# Patient Record
Sex: FEMALE | Race: WHITE | NOT HISPANIC OR LATINO | Employment: UNEMPLOYED | URBAN - METROPOLITAN AREA
[De-identification: names, ages, dates, MRNs, and addresses within clinical notes are randomized per-mention and may not be internally consistent; named-entity substitution may affect disease eponyms.]

---

## 2017-05-20 PROBLEM — M81.0 AGE-RELATED OSTEOPOROSIS WITHOUT CURRENT PATHOLOGICAL FRACTURE: Status: ACTIVE | Noted: 2017-05-20

## 2017-11-29 ENCOUNTER — HOSPITAL ENCOUNTER (OUTPATIENT)
Dept: RADIOLOGY | Facility: HOSPITAL | Age: 77
Discharge: HOME | End: 2017-11-29
Attending: FAMILY MEDICINE
Payer: MEDICARE

## 2017-11-29 VITALS — HEIGHT: 63 IN | WEIGHT: 207 LBS | BODY MASS INDEX: 36.68 KG/M2

## 2017-11-29 DIAGNOSIS — Z12.31 ENCOUNTER FOR SCREENING MAMMOGRAM FOR BREAST CANCER: ICD-10-CM

## 2017-11-29 PROCEDURE — 77067 SCR MAMMO BI INCL CAD: CPT | Mod: 26,,, | Performed by: RADIOLOGY

## 2017-11-29 PROCEDURE — 77067 SCR MAMMO BI INCL CAD: CPT | Mod: TC

## 2017-11-29 PROCEDURE — 77063 BREAST TOMOSYNTHESIS BI: CPT | Mod: 26,,, | Performed by: RADIOLOGY

## 2018-01-05 ENCOUNTER — HOSPITAL ENCOUNTER (EMERGENCY)
Facility: HOSPITAL | Age: 78
Discharge: HOME | End: 2018-01-05
Attending: SURGERY
Payer: MEDICARE

## 2018-01-05 VITALS
DIASTOLIC BLOOD PRESSURE: 66 MMHG | HEIGHT: 63 IN | TEMPERATURE: 100 F | RESPIRATION RATE: 17 BRPM | HEART RATE: 86 BPM | WEIGHT: 210 LBS | BODY MASS INDEX: 37.21 KG/M2 | SYSTOLIC BLOOD PRESSURE: 113 MMHG

## 2018-01-05 DIAGNOSIS — S09.93XA FACIAL TRAUMA: ICD-10-CM

## 2018-01-05 DIAGNOSIS — S42.91XA CLOSED FRACTURE OF RIGHT SHOULDER, INITIAL ENCOUNTER: ICD-10-CM
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
## 2018-01-05 DIAGNOSIS — S02.2XXA CLOSED FRACTURE OF NASAL BONE, INITIAL ENCOUNTER: Primary | ICD-10-CM

## 2018-01-05 PROCEDURE — 12013 RPR F/E/E/N/L/M 2.6-5.0 CM: CPT

## 2018-01-05 PROCEDURE — 96372 THER/PROPH/DIAG INJ SC/IM: CPT

## 2018-01-05 PROCEDURE — 99284 EMERGENCY DEPT VISIT MOD MDM: CPT | Mod: 25

## 2018-01-05 PROCEDURE — 63600175 PHARM REV CODE 636 W HCPCS: Performed by: SURGERY

## 2018-01-05 PROCEDURE — 29240 STRAPPING OF SHOULDER: CPT | Mod: RT,59

## 2018-01-05 PROCEDURE — 25000003 PHARM REV CODE 250: Performed by: SURGERY

## 2018-01-05 RX ORDER — IBUPROFEN 800 MG/1
800 TABLET ORAL
Status: COMPLETED | OUTPATIENT
Start: 2018-01-05 | End: 2018-01-05

## 2018-01-05 RX ORDER — HYDROCODONE BITARTRATE AND ACETAMINOPHEN 10; 325 MG/1; MG/1
1 TABLET ORAL EVERY 6 HOURS PRN
Qty: 20 TABLET | Refills: 0 | Status: SHIPPED | OUTPATIENT
Start: 2018-01-05 | End: 2018-01-15

## 2018-01-05 RX ORDER — ACETAMINOPHEN 500 MG
1000 TABLET ORAL
Status: COMPLETED | OUTPATIENT
Start: 2018-01-05 | End: 2018-01-05

## 2018-01-05 RX ORDER — HYDROMORPHONE HYDROCHLORIDE 2 MG/ML
0.5 INJECTION, SOLUTION INTRAMUSCULAR; INTRAVENOUS; SUBCUTANEOUS
Status: COMPLETED | OUTPATIENT
Start: 2018-01-05 | End: 2018-01-05

## 2018-01-05 RX ORDER — ONDANSETRON 2 MG/ML
4 INJECTION INTRAMUSCULAR; INTRAVENOUS
Status: COMPLETED | OUTPATIENT
Start: 2018-01-05 | End: 2018-01-05

## 2018-01-05 RX ORDER — ONDANSETRON 4 MG/1
4 TABLET, ORALLY DISINTEGRATING ORAL EVERY 8 HOURS PRN
Qty: 20 TABLET | Refills: 0 | Status: SHIPPED | OUTPATIENT
Start: 2018-01-05 | End: 2023-02-06

## 2018-01-05 RX ADMIN — IBUPROFEN 800 MG: 800 TABLET ORAL at 04:01

## 2018-01-05 RX ADMIN — ONDANSETRON 4 MG: 2 INJECTION INTRAMUSCULAR; INTRAVENOUS at 06:01

## 2018-01-05 RX ADMIN — ACETAMINOPHEN 1000 MG: 500 TABLET ORAL at 04:01

## 2018-01-05 RX ADMIN — HYDROMORPHONE HYDROCHLORIDE 0.5 MG: 2 INJECTION INTRAMUSCULAR; INTRAVENOUS; SUBCUTANEOUS at 06:01

## 2018-01-05 NOTE — ED TRIAGE NOTES
"Patient arrived in wheelchair. Patient 77 y.o/f fell on face. Patient stated "I tripped over my grocery." Patient provided medical history. Patient placed in grown. Bright red blood to lip. Oral swelling. Patient able to ambulate without assistant.   "

## 2018-01-06 NOTE — ED NOTES
Discharge instruction/escript instruction given.   Patient verbalized understanding.  Discharge home in stable condition.  No acute distress.

## 2018-01-06 NOTE — ED PROVIDER NOTES
[facility]                                         January 5, 2018                   Chief Complaint  77 y.o. female with Fall and Facial Swelling (Lips)    History of Present Illness  Rosaura Ga presents to the emergency room with right facial and shoulder pain today  Patient had a slip and fall hitting her right face and shoulder, no LOC reported on the fall  The patient is alert and oriented ×3 with a GCS 15 and normal motor neuro exam the ER  Patient has a small horizontal laceration/3 cm above the right lip, Dermabond in the ER  Patient also has significant right shoulder pain with a fracture noted on the x-ray in the ER  Pt is neurovascularly intact with good  and normal tone, good pulses in all extremities    The history is provided by the patient    Past Medical History   -- Arthritis    -- Asthma    -- Asthmatic bronchitis    -- COPD (chronic obstructive pulmonary disease)    -- Encounter for blood transfusion    -- Heart murmur    -- Hypertension    -- Osteoporosis      Past Surgical History   -- APPENDECTOMY     -- APPENDECTOMY     -- CATARACT EXTRACTION     -- COLONOSCOPY W/ BIOPSIES AND POLYPECTOMY     -- EYE SURGERY        ALLERGIES: Amoxicillin and codeine  History reviewed. No pertinent family history.    Review of Systems and Physical Exam     Review of Systems  -- Constitution - no fever, denies fatigue, no weakness, no chills  -- Eyes - no tearing or redness, no visual disturbance  -- Ear, Nose - no tinnitus or earache, no nasal congestion or discharge  -- Mouth,Throat - no sore throat, no toothache, normal voice, normal swallowing  -- Respiratory - denies cough and congestion, no shortness of breath, no ROA  -- Cardiovascular - denies chest pain, no palpitations, denies claudication  -- Gastrointestinal - denies abdominal pain, nausea, vomiting, or diarrhea  -- Genitourinary - no dysuria, no denies flank pain, no hematuria or frequency   -- Musculoskeletal - right  shoulder and facial pain post fall  -- Neurological - no headache, denies weakness or seizure; no LOC  -- Skin - denies pallor, rash, or changes in skin. no hives or welts noted    Vital Signs  -- Her oral temperature is 99.5 °F (37.5 °C).   -- Her blood pressure is 113/66 and her pulse is 86.   -- Her respiration is 17.      Physical Exam  -- Nursing note and vitals reviewed  -- Constitutional: Appears well-developed and well-nourished  -- Head: Minor abrasions and scrapes on the face and right upper lip  -- Eyes: Pupils are equal and reactive to light. Normal conjunctiva and lids  -- Nose: Nose normal in appearance, nares grossly normal. No discharge  -- Throat: Mucous membranes moist, pharynx normal, normal tonsils. No lesions   -- Ears: External ears and TM normal bilaterally. Normal hearing and no drainage  -- Neck: Normal range of motion. Neck supple. No masses, trachea midline  -- Cardiac: Normal rate, regular rhythm and normal heart sounds  -- Pulmonary: Normal respiratory effort, breath sounds clear to auscultation  -- Abdominal: Soft, no tenderness. Normal bowel sounds. Normal liver edge  -- Musculoskeletal: Right shoulder pain and bruising, minor swelling   -- Neurological: No focal deficits. Showed good interaction with staff  -- Vascular: Posterior tibial, dorsalis pedis and radial pulses 2+ bilaterally    -- Lymphatics: No cervical or peripheral lymphadenopathy. No edema noted  -- Skin: Warm and dry. No evidence of rash or cellulitis  -- Psychiatric: Normal mood and affect. Bedside behavior is appropriate    Emergency Room Course     Treatment and Evaluation  -- The CT of the head performed in the ER today was negative for acute pathology   -- CT of the face shows a nondisplaced nasal fracture  -- Right shoulder and humerus x-rays show a humeral head fracture  -- Sling and swathe placed on the affected limb by the CNA for comfort and decreased pain      Laceration Repair  -- Performed by: GRACIE WEBER  ERNESTINE  -- Consent Done: Emergent Situation  -- Body area: Right upper lip/facial abrasion  -- Laceration length: 3 cm  -- Tendon involvement: none  -- Nerve involvement: none  -- Vascular damage: no  -- Amount of cleaning: standard  -- Skin closure: Dermabond     Medications Given  -- acetaminophen tablet 1,000 mg (1,000 mg Oral Given 1/5/18 1617)   -- ibuprofen tablet 800 mg (800 mg Oral Given 1/5/18 1617)   -- hydromorphone (PF) injection 0.5 mg (0.5 mg Intramuscular Given 1/5/18 1812)   -- ondansetron injection 4 mg (4 mg Intramuscular Given 1/5/18 1812)     Medical Decision Making  -- Diagnosis management comments: 77 y.o. female with a trip and fall today  -- Patient has an obvious significant right humeral head fracture, no dislocation  -- I discussed this finding with orthopedics nurse practitioner Jarred this evening  -- She advocates for sling and swath and follow-up in clinic on Monday morning  -- Family is comfortable with this plan, the patient has plenty of support at home    Diagnosis  -- Closed fracture of nasal bone  -- Facial trauma  -- Fall  -- Closed fracture of right shoulder    Disposition and Plan  -- Disposition: home  -- Condition: stable  -- Follow-up: Patient to follow up with or so Monday morning at 9:30 AM  -- I advised the patient that we have found no life threatening condition today  -- At this time, I believe the patient is clinically stable for discharge.   -- The patient acknowledges that close follow up with a MD is required   -- Patient agrees to comply with all instruction and direction given in the ER    This note is dictated on Dragon Natural Speaking word recognition program.  There are word recognition mistakes that are occasionally missed on review.           Donato Robledo MD  01/05/18 192

## 2022-01-24 ENCOUNTER — LAB VISIT (OUTPATIENT)
Dept: FAMILY MEDICINE | Facility: CLINIC | Age: 82
End: 2022-01-24
Payer: MEDICARE

## 2022-01-24 DIAGNOSIS — Z11.52 ENCOUNTER FOR SCREENING FOR COVID-19: Primary | ICD-10-CM

## 2022-01-24 LAB
CTP QC/QA: YES
SARS-COV-2 AG RESP QL IA.RAPID: NEGATIVE

## 2022-01-24 PROCEDURE — 87811 SARS-COV-2 COVID19 W/OPTIC: CPT | Mod: PBBFAC

## 2022-01-24 NOTE — PROGRESS NOTES
Instructions for Patients with Confirmed or Suspected COVID-19    If you are awaiting your test result, you will either be called or it will be released to the patient portal.  If you have any questions about your test, please visit [email or web address] or call our COVID-19 information line at [phone].      Please isolate yourself at home.  You may leave home and/or return to work once the following conditions are met:    If you have symptoms and tested positive:   More than 5 days since symptoms first appeared AND   More than 24 hours fever free without medications AND       symptoms have improved   · For five days after ending isolation, masks are required.    If you had no symptoms but tested positive:   More than 5 days since the date of the first positive test. If you develop symptoms, then use the guidelines above  · For five days after ending isolation, masks are required.      Testing is not recommended if you are symptom free after completing isolation.

## 2023-01-26 ENCOUNTER — HOSPITAL ENCOUNTER (EMERGENCY)
Facility: HOSPITAL | Age: 83
Discharge: HOME | End: 2023-01-27
Attending: STUDENT IN AN ORGANIZED HEALTH CARE EDUCATION/TRAINING PROGRAM
Payer: MEDICARE

## 2023-01-26 DIAGNOSIS — D64.9 SYMPTOMATIC ANEMIA: Primary | ICD-10-CM

## 2023-01-26 LAB
ABO + RH BLD: NORMAL
ALBUMIN SERPL BCP-MCNC: 3.7 G/DL (ref 3.5–5.2)
ALP SERPL-CCNC: 86 U/L (ref 55–135)
ALT SERPL W/O P-5'-P-CCNC: 11 U/L (ref 10–44)
ANION GAP SERPL CALC-SCNC: 9 MMOL/L (ref 8–16)
APTT BLDCRRT: 24.9 SEC (ref 21–32)
AST SERPL-CCNC: 23 U/L (ref 10–40)
BASOPHILS # BLD AUTO: 0.03 K/UL (ref 0–0.2)
BASOPHILS NFR BLD: 0.4 % (ref 0–1.9)
BILIRUB SERPL-MCNC: 0.2 MG/DL (ref 0.1–1)
BLD GP AB SCN CELLS X3 SERPL QL: NORMAL
BLD PROD TYP BPU: NORMAL
BLD PROD TYP BPU: NORMAL
BLOOD UNIT EXPIRATION DATE: NORMAL
BLOOD UNIT EXPIRATION DATE: NORMAL
BLOOD UNIT TYPE CODE: 5100
BLOOD UNIT TYPE CODE: 5100
BLOOD UNIT TYPE: NORMAL
BLOOD UNIT TYPE: NORMAL
BUN SERPL-MCNC: 37 MG/DL (ref 8–23)
CALCIUM SERPL-MCNC: 9.8 MG/DL (ref 8.7–10.5)
CHLORIDE SERPL-SCNC: 110 MMOL/L (ref 95–110)
CO2 SERPL-SCNC: 20 MMOL/L (ref 23–29)
CODING SYSTEM: NORMAL
CODING SYSTEM: NORMAL
CREAT SERPL-MCNC: 1.7 MG/DL (ref 0.5–1.4)
DIFFERENTIAL METHOD: ABNORMAL
DISPENSE STATUS: NORMAL
DISPENSE STATUS: NORMAL
EOSINOPHIL # BLD AUTO: 0.2 K/UL (ref 0–0.5)
EOSINOPHIL NFR BLD: 2.3 % (ref 0–8)
ERYTHROCYTE [DISTWIDTH] IN BLOOD BY AUTOMATED COUNT: 16 % (ref 11.5–14.5)
EST. GFR  (NO RACE VARIABLE): 30 ML/MIN/1.73 M^2
GLUCOSE SERPL-MCNC: 109 MG/DL (ref 70–110)
HCT VFR BLD AUTO: 20 % (ref 37–48.5)
HGB BLD-MCNC: 6 G/DL (ref 12–16)
IMM GRANULOCYTES # BLD AUTO: 0.02 K/UL (ref 0–0.04)
IMM GRANULOCYTES NFR BLD AUTO: 0.3 % (ref 0–0.5)
INR PPP: 1 (ref 0.8–1.2)
LYMPHOCYTES # BLD AUTO: 2.1 K/UL (ref 1–4.8)
LYMPHOCYTES NFR BLD: 26.5 % (ref 18–48)
MCH RBC QN AUTO: 25.6 PG (ref 27–31)
MCHC RBC AUTO-ENTMCNC: 30 G/DL (ref 32–36)
MCV RBC AUTO: 86 FL (ref 82–98)
MONOCYTES # BLD AUTO: 0.9 K/UL (ref 0.3–1)
MONOCYTES NFR BLD: 11.4 % (ref 4–15)
NEUTROPHILS # BLD AUTO: 4.6 K/UL (ref 1.8–7.7)
NEUTROPHILS NFR BLD: 59.1 % (ref 38–73)
NRBC BLD-RTO: 0 /100 WBC
NUM UNITS TRANS PACKED RBC: NORMAL
NUM UNITS TRANS PACKED RBC: NORMAL
OB PNL STL: POSITIVE
PLATELET # BLD AUTO: 269 K/UL (ref 150–450)
PMV BLD AUTO: 10.4 FL (ref 9.2–12.9)
POTASSIUM SERPL-SCNC: 4.5 MMOL/L (ref 3.5–5.1)
PROT SERPL-MCNC: 7.4 G/DL (ref 6–8.4)
PROTHROMBIN TIME: 10.3 SEC (ref 9–12.5)
RBC # BLD AUTO: 2.34 M/UL (ref 4–5.4)
SODIUM SERPL-SCNC: 139 MMOL/L (ref 136–145)
TROPONIN I SERPL DL<=0.01 NG/ML-MCNC: 0.01 NG/ML (ref 0–0.03)
WBC # BLD AUTO: 7.74 K/UL (ref 3.9–12.7)

## 2023-01-26 PROCEDURE — 86900 BLOOD TYPING SEROLOGIC ABO: CPT | Performed by: STUDENT IN AN ORGANIZED HEALTH CARE EDUCATION/TRAINING PROGRAM

## 2023-01-26 PROCEDURE — 93010 ELECTROCARDIOGRAM REPORT: CPT | Mod: ,,, | Performed by: INTERNAL MEDICINE

## 2023-01-26 PROCEDURE — 85025 COMPLETE CBC W/AUTO DIFF WBC: CPT | Performed by: STUDENT IN AN ORGANIZED HEALTH CARE EDUCATION/TRAINING PROGRAM

## 2023-01-26 PROCEDURE — 82272 OCCULT BLD FECES 1-3 TESTS: CPT | Performed by: STUDENT IN AN ORGANIZED HEALTH CARE EDUCATION/TRAINING PROGRAM

## 2023-01-26 PROCEDURE — P9016 RBC LEUKOCYTES REDUCED: HCPCS | Performed by: STUDENT IN AN ORGANIZED HEALTH CARE EDUCATION/TRAINING PROGRAM

## 2023-01-26 PROCEDURE — 85610 PROTHROMBIN TIME: CPT | Performed by: STUDENT IN AN ORGANIZED HEALTH CARE EDUCATION/TRAINING PROGRAM

## 2023-01-26 PROCEDURE — 80053 COMPREHEN METABOLIC PANEL: CPT | Performed by: STUDENT IN AN ORGANIZED HEALTH CARE EDUCATION/TRAINING PROGRAM

## 2023-01-26 PROCEDURE — 93010 EKG 12-LEAD: ICD-10-PCS | Mod: ,,, | Performed by: INTERNAL MEDICINE

## 2023-01-26 PROCEDURE — 84484 ASSAY OF TROPONIN QUANT: CPT | Performed by: STUDENT IN AN ORGANIZED HEALTH CARE EDUCATION/TRAINING PROGRAM

## 2023-01-26 PROCEDURE — 36415 COLL VENOUS BLD VENIPUNCTURE: CPT | Performed by: STUDENT IN AN ORGANIZED HEALTH CARE EDUCATION/TRAINING PROGRAM

## 2023-01-26 PROCEDURE — 85730 THROMBOPLASTIN TIME PARTIAL: CPT | Performed by: STUDENT IN AN ORGANIZED HEALTH CARE EDUCATION/TRAINING PROGRAM

## 2023-01-26 PROCEDURE — 99285 EMERGENCY DEPT VISIT HI MDM: CPT | Mod: 25

## 2023-01-26 PROCEDURE — 36430 TRANSFUSION BLD/BLD COMPNT: CPT

## 2023-01-26 PROCEDURE — 86920 COMPATIBILITY TEST SPIN: CPT | Performed by: STUDENT IN AN ORGANIZED HEALTH CARE EDUCATION/TRAINING PROGRAM

## 2023-01-26 PROCEDURE — 93005 ELECTROCARDIOGRAM TRACING: CPT

## 2023-01-26 RX ORDER — HYDROCODONE BITARTRATE AND ACETAMINOPHEN 500; 5 MG/1; MG/1
TABLET ORAL
Status: DISCONTINUED | OUTPATIENT
Start: 2023-01-26 | End: 2023-01-27 | Stop reason: HOSPADM

## 2023-01-26 RX ORDER — FERROUS SULFATE 325(65) MG
TABLET ORAL
COMMUNITY
Start: 2022-12-05 | End: 2023-05-15

## 2023-01-26 NOTE — ED PROVIDER NOTES
"Encounter Date: 2023    This document was partially completed using speech recognition software and may contain misspellings, grammatical errors, and/or unexpected word substitutions.       History     Chief Complaint   Patient presents with    Abnormal Lab     82 year old female with a PMHx of COPD, HTN presents to the ED with her daughter after being sent by [facility] for Hgb 6.6 (labs were drawn yesterday). Chronically short of breath but does report some fatigue. Denies bloody stools, black stools, abdominal pain. Had an echo today and [facility] told her to go to the ED.      Review of patient's allergies indicates:   Allergen Reactions    Amoxicillin Nausea Only and Other (See Comments)     Stomach cramps, vaginal rash, hair falls out    Celebrex [celecoxib]      Blood pressure elevated    Codeine Other (See Comments)     Patient states, "I use it, but in very low doses.  If I take a full dose, it makes me crazy."       Past Medical History:   Diagnosis Date    Arthritis     Asthma     Asthmatic bronchitis     COPD (chronic obstructive pulmonary disease)     Encounter for blood transfusion     Heart murmur     Hypertension     Osteoporosis      Past Surgical History:   Procedure Laterality Date    APPENDECTOMY      APPENDECTOMY      CATARACT EXTRACTION Bilateral     COLONOSCOPY W/ BIOPSIES AND POLYPECTOMY      EYE SURGERY Right     2016     History reviewed. No pertinent family history.  Social History     Tobacco Use    Smoking status: Former     Packs/day: 2.00     Years: 33.00     Pack years: 66.00     Types: Cigarettes     Quit date: 3/26/1987     Years since quittin.8    Smokeless tobacco: Never   Substance Use Topics    Alcohol use: No    Drug use: No     Review of Systems   Constitutional:  Positive for fatigue. Negative for chills and fever.   HENT:  Negative for congestion, rhinorrhea and sneezing.    Eyes:  Negative for discharge and redness.   Respiratory:  Positive for shortness of " breath. Negative for cough.    Cardiovascular:  Negative for chest pain and palpitations.   Gastrointestinal:  Negative for abdominal pain, diarrhea, nausea and vomiting.   Genitourinary:  Negative for dysuria, frequency, vaginal bleeding and vaginal discharge.   Musculoskeletal:  Negative for back pain and neck pain.   Skin:  Negative for rash and wound.   Neurological:  Negative for weakness, numbness and headaches.     Physical Exam     Initial Vitals [01/26/23 1628]   BP Pulse Resp Temp SpO2   (!) 152/67 83 16 97.1 °F (36.2 °C) 98 %      MAP       --         Physical Exam    Nursing note and vitals reviewed.  Constitutional: She appears well-developed. She is not diaphoretic. No distress.   HENT:   Head: Normocephalic and atraumatic.   Right Ear: External ear normal.   Left Ear: External ear normal.   Eyes: Right eye exhibits no discharge. Left eye exhibits no discharge. No scleral icterus.   Neck: Neck supple.   Cardiovascular:  Normal rate and regular rhythm.           Pulmonary/Chest: Breath sounds normal. No stridor. No respiratory distress. She has no wheezes. She has no rhonchi. She has no rales.   Abdominal: Abdomen is soft. There is no abdominal tenderness. There is no guarding.   Genitourinary: Rectum:      External hemorrhoid (not bleeding) present.      Genitourinary Comments: Chaperoned by female RN Ermelinda Harrington stool, no melena, no hematochezia     Musculoskeletal:         General: No edema.      Cervical back: Neck supple.     Neurological: She is alert and oriented to person, place, and time.   Skin: Skin is warm and dry. Capillary refill takes less than 2 seconds.   Psychiatric: She has a normal mood and affect.       ED Course   Critical Care    Date/Time: 1/26/2023 5:55 PM  Performed by: Dane Mittal DO  Authorized by: Dane Mittal DO   Direct patient critical care time: 23 minutes  Additional history critical care time: 4 minutes  Ordering / reviewing critical care time: 3  minutes  Documentation critical care time: 2 minutes  Consult with family critical care time: 3 minutes  Total critical care time (exclusive of procedural time) : 35 minutes  Critical care time was exclusive of separately billable procedures and treating other patients and teaching time.  Critical care was necessary to treat or prevent imminent or life-threatening deterioration of the following conditions: circulatory failure.  Critical care was time spent personally by me on the following activities: development of treatment plan with patient or surrogate, interpretation of cardiac output measurements, evaluation of patient's response to treatment, examination of patient, obtaining history from patient or surrogate, ordering and performing treatments and interventions, ordering and review of laboratory studies, re-evaluation of patient's condition, review of old charts and ordering and review of radiographic studies.      Labs Reviewed   CBC W/ AUTO DIFFERENTIAL - Abnormal; Notable for the following components:       Result Value    RBC 2.34 (*)     Hemoglobin 6.0 (*)     Hematocrit 20.0 (*)     MCH 25.6 (*)     MCHC 30.0 (*)     RDW 16.0 (*)     All other components within normal limits   COMPREHENSIVE METABOLIC PANEL - Abnormal; Notable for the following components:    CO2 20 (*)     BUN 37 (*)     Creatinine 1.7 (*)     eGFR 30 (*)     All other components within normal limits   OCCULT BLOOD X 1, STOOL - Abnormal; Notable for the following components:    Occult Blood Positive (*)     All other components within normal limits   TROPONIN I   PROTIME-INR   APTT   TYPE & SCREEN   PREPARE RBC SOFT          Imaging Results    None          Medications   0.9%  NaCl infusion (for blood administration) (has no administration in time range)     Medical Decision Making:   Differential Diagnosis:   Ddx: GI bleed, anemia, shock  ED Management:  Based on the patient's evaluation - patient will need PRBCs. Hgb 6 / Hct 20. FOBT  positive but brown stool on BRIDGETTE with no melena or hematochezia. Will transfuse 2 units of PRBCs and discharge home with cardiology follow up. Patient is in agreement.                        Clinical Impression:   Final diagnoses:  [D64.9] Symptomatic anemia (Primary)        ED Disposition Condition    Discharge Stable          ED Prescriptions    None       Follow-up Information       Follow up With Specialties Details Why Contact Info    Your cardiologist  Schedule an appointment as soon as possible for a visit in 1 week      [facility] Emergency Dept Emergency Medicine Go to  If symptoms worsen [facility] [facility] [address]  [phone]                Dane Mittal DO  01/26/23 4189

## 2023-01-27 VITALS
HEART RATE: 71 BPM | RESPIRATION RATE: 22 BRPM | OXYGEN SATURATION: 99 % | BODY MASS INDEX: 35.29 KG/M2 | DIASTOLIC BLOOD PRESSURE: 73 MMHG | SYSTOLIC BLOOD PRESSURE: 168 MMHG | TEMPERATURE: 99 F | HEIGHT: 66 IN | WEIGHT: 219.56 LBS

## 2023-02-09 PROBLEM — D50.0 IRON DEFICIENCY ANEMIA SECONDARY TO BLOOD LOSS (CHRONIC): Status: ACTIVE | Noted: 2023-02-09

## 2023-03-22 PROBLEM — C18.9 COLON CANCER: Status: ACTIVE | Noted: 2023-03-22

## 2023-03-22 PROBLEM — J44.9 CHRONIC OBSTRUCTIVE PULMONARY DISEASE: Status: ACTIVE | Noted: 2023-03-22

## 2023-03-22 PROBLEM — I10 HYPERTENSION: Status: ACTIVE | Noted: 2023-03-22

## 2023-03-27 PROBLEM — E63.9 INADEQUATE DIETARY ENERGY INTAKE: Status: ACTIVE | Noted: 2023-03-27

## 2023-03-31 PROBLEM — N17.9 ACUTE RENAL FAILURE: Status: ACTIVE | Noted: 2023-03-31

## 2023-04-04 PROBLEM — E87.6 HYPOKALEMIA: Status: ACTIVE | Noted: 2023-04-04

## 2023-07-10 PROBLEM — N17.9 ACUTE RENAL FAILURE: Status: RESOLVED | Noted: 2023-03-31 | Resolved: 2023-07-10

## 2023-12-24 ENCOUNTER — HOSPITAL ENCOUNTER (EMERGENCY)
Facility: HOSPITAL | Age: 83
Discharge: HOME | End: 2023-12-24
Attending: STUDENT IN AN ORGANIZED HEALTH CARE EDUCATION/TRAINING PROGRAM
Payer: MEDICARE

## 2023-12-24 VITALS
OXYGEN SATURATION: 99 % | WEIGHT: 205.25 LBS | DIASTOLIC BLOOD PRESSURE: 78 MMHG | HEART RATE: 90 BPM | SYSTOLIC BLOOD PRESSURE: 185 MMHG | BODY MASS INDEX: 37.54 KG/M2 | TEMPERATURE: 98 F | RESPIRATION RATE: 18 BRPM

## 2023-12-24 DIAGNOSIS — L02.211 ABSCESS OF SKIN OF ABDOMEN: Primary | ICD-10-CM

## 2023-12-24 PROCEDURE — 99283 EMERGENCY DEPT VISIT LOW MDM: CPT | Mod: 25

## 2023-12-24 PROCEDURE — 25000003 PHARM REV CODE 250

## 2023-12-24 PROCEDURE — 87077 CULTURE AEROBIC IDENTIFY: CPT

## 2023-12-24 PROCEDURE — 87070 CULTURE OTHR SPECIMN AEROBIC: CPT

## 2023-12-24 PROCEDURE — 87186 SC STD MICRODIL/AGAR DIL: CPT

## 2023-12-24 PROCEDURE — 10060 I&D ABSCESS SIMPLE/SINGLE: CPT

## 2023-12-24 RX ORDER — CLINDAMYCIN HYDROCHLORIDE 300 MG/1
300 CAPSULE ORAL 3 TIMES DAILY
Qty: 15 CAPSULE | Refills: 0 | Status: SHIPPED | OUTPATIENT
Start: 2023-12-24 | End: 2023-12-29

## 2023-12-24 RX ORDER — LIDOCAINE HYDROCHLORIDE 10 MG/ML
10 INJECTION INFILTRATION; PERINEURAL
Status: COMPLETED | OUTPATIENT
Start: 2023-12-24 | End: 2023-12-24

## 2023-12-24 RX ADMIN — LIDOCAINE HYDROCHLORIDE 10 ML: 10 INJECTION, SOLUTION INFILTRATION; PERINEURAL at 02:12

## 2023-12-24 NOTE — ED PROVIDER NOTES
"Encounter Date: 12/24/2023       History     Chief Complaint   Patient presents with    Abscess     Pt arrives to the ed with c/o right lower abdomen abscess x 2 days. Redness and swelling around site.      This note is dictated on M*Modal word recognition program.  There are word recognition mistakes and grammatical errors that are occasionally missed on review.     Rosaura Ga is a 83 y.o. female presents to ER today with complaints of abscess to right middle abdomen.  Patient reports she had to sores on her abdomen and 1 evolved into an abscess over the last 48 hours.  Patient denies any systemic fevers.  Patient reports abscess is painful to touch.  Patient denies known history of MRSA/staph infection.      The history is provided by the patient.     Review of patient's allergies indicates:   Allergen Reactions    Amoxicillin Nausea Only and Other (See Comments)     Stomach cramps, vaginal rash, hair falls out    Celebrex [celecoxib]      Blood pressure elevated    Symbicort [budesonide-formoterol] Itching    Codeine Other (See Comments)     Patient states, "I use it, but in very low doses.  If I take a full dose, it makes me crazy."       Past Medical History:   Diagnosis Date    Anemia, unspecified     Arthritis     Asthma     Asthmatic bronchitis     Cancer     colon    CKD (chronic kidney disease) stage 4, GFR 15-29 ml/min     COPD (chronic obstructive pulmonary disease)     Edema, lower extremity     Elevated cholesterol     Encounter for blood transfusion     Heart murmur     History of anemia     History of blood transfusion     History of duodenal ulcer     Hypertension     Internal and external hemorrhoids without complication     Kidney disease     Lymphedema     Osteoporosis     Personal history of colonic polyps     PVC (premature ventricular contraction)     Venous insufficiency     Vitamin D deficiency      Past Surgical History:   Procedure Laterality Date    APPENDECTOMY  1956    " APPENDECTOMY      CATARACT EXTRACTION Bilateral     COLONOSCOPY N/A 2023    Procedure: COLONOSCOPY;  Surgeon: Giorgio Coe MD;  Location: [facility] ENDO;  Service: Endoscopy;  Laterality: N/A;    COLONOSCOPY W/ BIOPSIES AND POLYPECTOMY  2010    ESOPHAGOGASTRODUODENOSCOPY N/A 2023    Procedure: EGD (ESOPHAGOGASTRODUODENOSCOPY);  Surgeon: Giorgio Coe MD;  Location: [facility] ENDO;  Service: Endoscopy;  Laterality: N/A;    EYE SURGERY Right     2016    MOBILIZATION OF SPLENIC FLEXURE N/A 3/22/2023    Procedure: MOBILIZATION, SPLENIC FLEXURE;  Surgeon: Donald Schwab, MD;  Location: [facility] OR;  Service: General;  Laterality: N/A;    RIGHT HEMICOLECTOMY N/A 3/22/2023    Procedure: HEMICOLECTOMY, RIGHT EXTENDED OPEN;  Surgeon: Donald Schwab, MD;  Location: [facility] OR;  Service: General;  Laterality: N/A;     Family History   Problem Relation Age of Onset    Heart failure Mother     Coronary artery disease Mother     Cancer Father         lung    Emphysema Father      Social History     Tobacco Use    Smoking status: Former     Current packs/day: 0.00     Average packs/day: 2.0 packs/day for 33.0 years (66.1 ttl pk-yrs)     Types: Cigarettes     Start date:      Quit date: 3/26/1986     Years since quittin.7     Passive exposure: Past    Smokeless tobacco: Never   Substance Use Topics    Alcohol use: No    Drug use: No     Review of Systems   Constitutional: Negative.    HENT: Negative.     Eyes: Negative.    Respiratory: Negative.     Cardiovascular: Negative.    Gastrointestinal: Negative.    Endocrine: Negative.    Genitourinary: Negative.    Skin:  Positive for color change and wound.   Allergic/Immunologic: Negative.    Neurological: Negative.    Hematological: Negative.    Psychiatric/Behavioral: Negative.         Physical Exam     Initial Vitals   BP Pulse Resp Temp SpO2   23 1359 23 1359 23 1358 23 1358 23 1359   (!) 185/78 90 18 98.1 °F (36.7 °C) 99 %      MAP        --                Physical Exam    Constitutional: She appears well-developed and well-nourished. She is not diaphoretic. No distress.   HENT:   Right Ear: External ear normal.   Left Ear: External ear normal.   Eyes: Pupils are equal, round, and reactive to light. Right eye exhibits no discharge. Left eye exhibits no discharge.   Neck: Neck supple.   Normal range of motion.  Cardiovascular:  Normal rate.     Exam reveals no friction rub.       No murmur heard.  Pulmonary/Chest: Breath sounds normal. No respiratory distress. She has no wheezes. She has no rhonchi. She has no rales.   Abdominal: Abdomen is soft.   Musculoskeletal:         General: No tenderness or edema.      Cervical back: Normal range of motion and neck supple.     Neurological: She is alert. GCS score is 15. GCS eye subscore is 4. GCS verbal subscore is 5. GCS motor subscore is 6.   Skin: Abscess (patient has abscess to right mid abdomen with fluctuance, induration, erythema) noted.   Psychiatric: She has a normal mood and affect. Her behavior is normal. Thought content normal.         ED Course   I & D - Incision and Drainage    Date/Time: 2023 2:22 PM  Location procedure was performed: [facility] EMERGENCY DEPARTMENT    Performed by: Donato Ferrari NP  Authorized by: Lefty Thakkar MD  Consent Done: Yes  Consent: Verbal consent obtained.  Consent given by: patient  Patient understanding: patient states understanding of the procedure being performed  Patient identity confirmed: name,  and MRN  Type: abscess  Body area: trunk  Location details: abdomen  Anesthesia: local infiltration    Anesthesia:  Local Anesthetic: lidocaine 1% without epinephrine  Scalpel size: 11  Incision type: single straight  Incision depth: dermal  Complexity: simple  Drainage: pus and serosanguinous  Drainage amount: copious  Wound treatment: incision, drainage, expression of material, deloculation and wound packed  Packing material:  in  gauze  Complications: No  Estimated blood loss (mL): 3  Specimens: Yes  Implants: No  Patient tolerance: Patient tolerated the procedure well with no immediate complications  Comments: Wound culture was obtained when the abscess was incised.  Wound culture was sent to microbiology laboratory for further evaluation.  Results will be back in the next 48-72 hours.    Incision depth: dermal        Labs Reviewed   CULTURE, AEROBIC  (SPECIFY SOURCE)          Imaging Results    None          Medications   LIDOcaine HCL 10 mg/ml (1%) injection 10 mL (10 mLs Other Given by Provider 12/24/23 1195)     Medical Decision Making  Differential diagnosis includes abscess, cellulitis, MRSA, staph infection    Patient has obvious abscess to right abdomen.  There is some surrounding erythema which could represent possible spreading cellulitis will place patient on clindamycin antibiotic.  Patient has a history of chronic kidney disease would like to avoid Bactrim.  Incision and drainage performed in ER today.  Please see procedure note.  Patient instructed to have packing removed by medical provider within the next 48 hours.  Patient instructed to return to ER immediately with any worsening or concerning symptoms such as fever, worsening pain, erythema, or any other symptom she deems makes her condition worse.  Patient stable at time of discharge in no acute distress.  No life-threatening illnesses were found during ER visit today.  Patient was instructed to follow-up with PCP or other recommended specialist within the next 48-72 hours.  Patient was instructed to return to ER immediately for any worsening or concerning symptoms.  All discharge instructions discussed with patient, and patient agrees to comply with discharge instructions given today.     Risk  Prescription drug management.                                      Clinical Impression:  Final diagnoses:  [L02.211] Abscess of skin of abdomen (Primary)          ED Disposition  Condition    Discharge Stable          ED Prescriptions       Medication Sig Dispense Start Date End Date Auth. Provider    clindamycin (CLEOCIN) 300 MG capsule Take 1 capsule (300 mg total) by mouth 3 (three) times daily. for 5 days 15 capsule 12/24/2023 12/29/2023 Donato Ferrari NP          Follow-up Information       Follow up With Specialties Details Why Contact Info    Jimmie Cerrato MD Family Medicine Schedule an appointment as soon as possible for a visit in 3 days  [address] [place] [id number] [phone]               Donato Ferrari NP  12/24/23 6093

## 2023-12-24 NOTE — DISCHARGE INSTRUCTIONS
Please follow-up with medical provider within the next 48 hours to have packing removed from abscess to abdomen.  Please take antibiotics as prescribed and follow-up with your PCP within next 72 hours.

## 2023-12-24 NOTE — ED TRIAGE NOTES
83 y.o. female presents to ER ED 02/ED 02B   Chief Complaint   Patient presents with    Abscess     Pt arrives to the ed with c/o right lower abdomen abscess x 2 days. Redness and swelling around site.

## 2023-12-27 LAB — BACTERIA SPEC AEROBE CULT: ABNORMAL

## 2024-05-03 PROBLEM — C18.2 MALIGNANT NEOPLASM OF ASCENDING COLON: Status: ACTIVE | Noted: 2023-03-22

## 2024-05-03 PROBLEM — C18.4 MALIGNANT NEOPLASM OF TRANSVERSE COLON: Status: ACTIVE | Noted: 2024-05-03
